# Patient Record
Sex: MALE | Race: BLACK OR AFRICAN AMERICAN | Employment: STUDENT | URBAN - METROPOLITAN AREA
[De-identification: names, ages, dates, MRNs, and addresses within clinical notes are randomized per-mention and may not be internally consistent; named-entity substitution may affect disease eponyms.]

---

## 2017-11-21 ENCOUNTER — OFFICE VISIT (OUTPATIENT)
Dept: INTERNAL MEDICINE CLINIC | Age: 22
End: 2017-11-21

## 2017-11-21 VITALS
DIASTOLIC BLOOD PRESSURE: 86 MMHG | OXYGEN SATURATION: 97 % | HEART RATE: 86 BPM | HEIGHT: 72 IN | SYSTOLIC BLOOD PRESSURE: 146 MMHG | RESPIRATION RATE: 18 BRPM | TEMPERATURE: 98.6 F | BODY MASS INDEX: 19.23 KG/M2 | WEIGHT: 142 LBS

## 2017-11-21 DIAGNOSIS — S99.921A FOOT TRAUMA, RIGHT, INITIAL ENCOUNTER: Primary | ICD-10-CM

## 2017-11-21 RX ORDER — IBUPROFEN 800 MG/1
800 TABLET ORAL
Qty: 90 TAB | Refills: 0 | Status: SHIPPED | OUTPATIENT
Start: 2017-11-21

## 2017-11-21 RX ORDER — KETOROLAC TROMETHAMINE 30 MG/ML
60 INJECTION, SOLUTION INTRAMUSCULAR; INTRAVENOUS ONCE
Qty: 1 VIAL | Refills: 0
Start: 2017-11-21 | End: 2017-11-21

## 2017-11-21 NOTE — MR AVS SNAPSHOT
Visit Information Date & Time Provider Department Dept. Phone Encounter #  
 11/21/2017  5:15 PM Elaine Barrett [facility] [phone] [id number] Upcoming Health Maintenance Date Due DTaP/Tdap/Td series (1 - Tdap) 12/21/2014 Influenza Age 5 to Adult 8/1/2017 Allergies as of 11/21/2017  Review Complete On: 11/21/2017 By: Elaine Barrett MD  
 No Known Allergies Current Immunizations  Never Reviewed No immunizations on file. Not reviewed this visit You Were Diagnosed With   
  
 Codes Comments Foot trauma, right, initial encounter    -  Primary ICD-10-CM: H63.710F ICD-9-CM: 017. 7 Vitals BP Pulse Temp Resp Height(growth percentile) Weight(growth percentile) 146/86 (BP 1 Location: Right arm, BP Patient Position: Sitting) 86 98.6 °F (37 °C) (Oral) 18 6' (1.829 m) 142 lb (64.4 kg) SpO2 BMI Smoking Status 97% 19.26 kg/m2 Former Smoker Vitals History BMI and BSA Data Body Mass Index Body Surface Area  
 19.26 kg/m 2 1.81 m 2 Preferred Pharmacy Pharmacy Name Phone [id number] [place] [phone] Your Updated Medication List  
  
   
This list is accurate as of: 11/21/17  5:52 PM.  Always use your most recent med list.  
  
  
  
  
 ibuprofen 800 mg tablet Commonly known as:  MOTRIN Take 1 Tab by mouth every eight (8) hours as needed for Pain.  
  
 ketorolac tromethamine 60 mg/2 mL Soln Commonly known as:  TORADOL  
2 mL by IntraMUSCular route once for 1 dose. Prescriptions Sent to Pharmacy Refills  
 ibuprofen (MOTRIN) 800 mg tablet 0 Sig: Take 1 Tab by mouth every eight (8) hours as needed for Pain. Class: Normal  
 Pharmacy: [address] [place] [address], [address] [place] [address] #: [phone] Route: Oral  
  
We Performed the Following AMB SUPPLY ORDER [[id number] Custom] Comments:  
 Post op shoe KETOROLAC TROMETHAMINE INJ [ [facility]] [place] THER/PROPH/DIAG INJECTION, SUBCUT/IM [id number] CPT(R)] To-Do List   
 11/21/2017 Imaging:  XR ANKLE RT MIN 3 V   
  
 11/21/2017 Imaging:  XR FOOT RT MIN 3 V Introducing hospitals & HEALTH SERVICES! [facility] introduces [facility] patient portal. Now you can access parts of your medical record, email your doctor's office, and request medication refills online. 1. In your internet browser, go to [email or web address] 2. Click on the First Time User? Click Here link in the Sign In box. You will see the New Member Sign Up page. 3. Enter your [facility] Access Code exactly as it appears below. You will not need to use this code after youve completed the sign-up process. If you do not sign up before the expiration date, you must request a new code. · [facility] Access Code: [email or web address] Expires: 2/19/2018  5:14 PM 
 
4. Enter the last four digits of your Social Security Number (xxxx) and Date of Birth (mm/dd/yyyy) as indicated and click Submit. You will be taken to the next sign-up page. 5. Create a [facility] ID. This will be your [facility] login ID and cannot be changed, so think of one that is secure and easy to remember. 6. Create a [facility] password. You can change your password at any time. 7. Enter your Password Reset Question and Answer. This can be used at a later time if you forget your password. 8. Enter your e-mail address. You will receive e-mail notification when new information is available in [address] 9. Click Sign Up. You can now view and download portions of your medical record. 10. Click the Download Summary menu link to download a portable copy of your medical information. If you have questions, please visit the Frequently Asked Questions section of the [facility] website. Remember, [facility] is NOT to be used for urgent needs. For medical emergencies, dial 911. Now available from your iPhone and Android! Please provide this summary of care documentation to your next provider. If you have any questions after today's visit, please call [phone].

## 2017-11-21 NOTE — PROGRESS NOTES
FAMILY MEDICINE CLINIC NOTE    S: Patient presents with pain along the dorso-medial aspect of the right foot since yesterday when the patient sustained trauma to the foot while skateboarding. Reports that he fell off his skateboard while attempting a flip. Hit the top of the right foot on concrete. No cracks or pops at the time. Was weight bearing immediately. Awoke this morning to increased pain but no significant swelling. No current outpatient prescriptions on file prior to visit. No current facility-administered medications on file prior to visit. Past Medical History:   Diagnosis Date    Depression     2016       Social History     Social History    Marital status: SINGLE     Spouse name: N/A    Number of children: N/A    Years of education: N/A     Occupational History    Not on file. Social History Main Topics    Smoking status: Former Smoker    Smokeless tobacco: Never Used    Alcohol use No    Drug use: Yes     Special: Marijuana    Sexual activity: Yes     Partners: Female     Other Topics Concern    Not on file     Social History Narrative    No narrative on file       No family history on file. O:  Visit Vitals    /86 (BP 1 Location: Right arm, BP Patient Position: Sitting)    Pulse 86    Temp 98.6 °F (37 °C) (Oral)    Resp 18    Ht 6' (1.829 m)    Wt 142 lb (64.4 kg)    SpO2 97%    BMI 19.26 kg/m2     NAD, comfortable  Moderate TTP along the extensor tendons of the first and second toe of the right foot  No TTP posterior to bilateral malleoli or at the base of thew fifth metatarsal    19 y.o. male      ICD-10-CM ICD-9-CM    1.  Foot trauma, right, initial encounter S99.921A 959.7 XR FOOT RT MIN 3 V      XR ANKLE RT MIN 3 V      ketorolac tromethamine (TORADOL) 60 mg/2 mL soln      KETOROLAC TROMETHAMINE INJ      [facility] THER/PROPH/DIAG INJECTION, SUBCUT/IM      ibuprofen (MOTRIN) 800 mg tablet      AMB SUPPLY ORDER

## 2017-11-21 NOTE — PROGRESS NOTES
ROOM # 10    Luis Alberto Ramirez presents today for   Chief Complaint   Patient presents with    Foot Pain     pt states he was skateboarding and hit the sidewalk and flipped off sakteboard, pt states he can't bend his right foot       Luis Alberto Ramirez preferred language for health care discussion is english/other. Is someone accompanying this pt? no    Is the patient using any DME equipment during OV? no    Depression Screening:  PHQ over the last two weeks 11/21/2017   Little interest or pleasure in doing things Not at all   Feeling down, depressed or hopeless Not at all   Total Score PHQ 2 0       Learning Assessment:  Learning Assessment 11/21/2017   PRIMARY LEARNER Patient   HIGHEST LEVEL OF EDUCATION - PRIMARY LEARNER  SOME COLLEGE   PRIMARY LANGUAGE ENGLISH   LEARNER PREFERENCE PRIMARY READING   ANSWERED BY patient   RELATIONSHIP SELF       Abuse Screening:  No flowsheet data found. Fall Risk  No flowsheet data found. Health Maintenance reviewed and discussed per provider. Yes    Luis Alberto Ramirez is due for none. Please order/place referral if appropriate. Advance Directive:  1. Do you have an advance directive in place? Patient Reply: no    2. If not, would you like material regarding how to put one in place? Patient Reply: no    Coordination of Care:  1. Have you been to the ER, urgent care clinic since your last visit? Hospitalized since your last visit? no    2. Have you seen or consulted any other health care providers outside of the [address] [place] [address] since your last visit? Include any pap smears or colon screening.  no